# Patient Record
Sex: MALE | Race: WHITE | Employment: FULL TIME | URBAN - METROPOLITAN AREA
[De-identification: names, ages, dates, MRNs, and addresses within clinical notes are randomized per-mention and may not be internally consistent; named-entity substitution may affect disease eponyms.]

---

## 2018-01-03 ENCOUNTER — OFFICE VISIT (OUTPATIENT)
Dept: PULMONOLOGY | Age: 48
End: 2018-01-03

## 2018-01-03 ENCOUNTER — TELEPHONE (OUTPATIENT)
Dept: PULMONOLOGY | Age: 48
End: 2018-01-03

## 2018-01-03 VITALS
HEART RATE: 88 BPM | TEMPERATURE: 98.2 F | OXYGEN SATURATION: 95 % | SYSTOLIC BLOOD PRESSURE: 126 MMHG | WEIGHT: 305 LBS | DIASTOLIC BLOOD PRESSURE: 80 MMHG | HEIGHT: 68 IN | BODY MASS INDEX: 46.23 KG/M2

## 2018-01-03 DIAGNOSIS — G47.33 OSA TREATED WITH BIPAP: ICD-10-CM

## 2018-01-03 DIAGNOSIS — G47.33 OSA (OBSTRUCTIVE SLEEP APNEA): Primary | ICD-10-CM

## 2018-01-03 PROCEDURE — 99243 OFF/OP CNSLTJ NEW/EST LOW 30: CPT | Performed by: INTERNAL MEDICINE

## 2018-01-03 RX ORDER — FEBUXOSTAT 40 MG/1
40 TABLET, FILM COATED ORAL DAILY
COMMUNITY

## 2018-01-03 ASSESSMENT — SLEEP AND FATIGUE QUESTIONNAIRES
HOW LIKELY ARE YOU TO NOD OFF OR FALL ASLEEP WHEN YOU ARE A PASSENGER IN A CAR FOR AN HOUR WITHOUT A BREAK: 0
HOW LIKELY ARE YOU TO NOD OFF OR FALL ASLEEP WHILE LYING DOWN TO REST IN THE AFTERNOON WHEN CIRCUMSTANCES PERMIT: 3
NECK CIRCUMFERENCE (INCHES): 17.25
HOW LIKELY ARE YOU TO NOD OFF OR FALL ASLEEP WHILE SITTING AND TALKING TO SOMEONE: 0
HOW LIKELY ARE YOU TO NOD OFF OR FALL ASLEEP IN A CAR, WHILE STOPPED FOR A FEW MINUTES IN TRAFFIC: 0
HOW LIKELY ARE YOU TO NOD OFF OR FALL ASLEEP WHILE SITTING QUIETLY AFTER LUNCH WITHOUT ALCOHOL: 0
HOW LIKELY ARE YOU TO NOD OFF OR FALL ASLEEP WHILE SITTING INACTIVE IN A PUBLIC PLACE: 0
HOW LIKELY ARE YOU TO NOD OFF OR FALL ASLEEP WHILE WATCHING TV: 0
HOW LIKELY ARE YOU TO NOD OFF OR FALL ASLEEP WHILE SITTING AND READING: 1
ESS TOTAL SCORE: 4

## 2018-12-04 ENCOUNTER — OFFICE VISIT (OUTPATIENT)
Dept: PULMONOLOGY | Age: 48
End: 2018-12-04
Payer: COMMERCIAL

## 2018-12-04 VITALS
HEIGHT: 68 IN | RESPIRATION RATE: 18 BRPM | DIASTOLIC BLOOD PRESSURE: 88 MMHG | SYSTOLIC BLOOD PRESSURE: 132 MMHG | BODY MASS INDEX: 46.53 KG/M2 | OXYGEN SATURATION: 96 % | WEIGHT: 307 LBS | HEART RATE: 74 BPM

## 2018-12-04 DIAGNOSIS — G47.33 OSA TREATED WITH BIPAP: Primary | ICD-10-CM

## 2018-12-04 PROCEDURE — 99213 OFFICE O/P EST LOW 20 MIN: CPT | Performed by: INTERNAL MEDICINE

## 2018-12-04 ASSESSMENT — SLEEP AND FATIGUE QUESTIONNAIRES
ESS TOTAL SCORE: 3
HOW LIKELY ARE YOU TO NOD OFF OR FALL ASLEEP WHILE LYING DOWN TO REST IN THE AFTERNOON WHEN CIRCUMSTANCES PERMIT: 2
HOW LIKELY ARE YOU TO NOD OFF OR FALL ASLEEP WHILE SITTING INACTIVE IN A PUBLIC PLACE: 0
HOW LIKELY ARE YOU TO NOD OFF OR FALL ASLEEP WHEN YOU ARE A PASSENGER IN A CAR FOR AN HOUR WITHOUT A BREAK: 1
HOW LIKELY ARE YOU TO NOD OFF OR FALL ASLEEP WHILE SITTING AND READING: 0
HOW LIKELY ARE YOU TO NOD OFF OR FALL ASLEEP WHILE WATCHING TV: 0
HOW LIKELY ARE YOU TO NOD OFF OR FALL ASLEEP IN A CAR, WHILE STOPPED FOR A FEW MINUTES IN TRAFFIC: 0
HOW LIKELY ARE YOU TO NOD OFF OR FALL ASLEEP WHILE SITTING AND TALKING TO SOMEONE: 0
HOW LIKELY ARE YOU TO NOD OFF OR FALL ASLEEP WHILE SITTING QUIETLY AFTER LUNCH WITHOUT ALCOHOL: 0
NECK CIRCUMFERENCE (INCHES): 16.5

## 2018-12-04 NOTE — PROGRESS NOTES
Chief Complaint: Snoring, evaluate for Obstructive Sleep Apnea     Referring Provider: Maxine Capone    Interval History:     Uses BiPAP almost every night, average use is 4 hours, residual AHI is 1, 90% pressure is just above EPAP 13. Benefits from use. Pressure on nasal bridge from mask    Presenting HPI: This is a 49-year-old white male history of obstructive sleep apnea associated with daytime sleepiness, previously treated with BiPAP with improvement. He lost some weight and stopped BiPAP. However he started having significant nocturnal awakenings associated with nocturia approximately once each hour. He restarted his previous BiPAP and has had improvement in his sleep quality but needs new supplies. No exacerbating factors. reports that he has never smoked. His smokeless tobacco use includes Snuff. Rollins sleepiness score:  3    Physical Exam:  Blood pressure 132/88, pulse 74, resp. rate 18, height 5' 8\" (1.727 m), weight (!) 307 lb (139.3 kg), SpO2 96 %.'  Constitutional:  No acute distress. HENT:  Oropharynx is clear and moist. Mallampati Class 1 with UPP  Neck: . No tracheal deviation present. No obvious masses. Neck Circumference: 16.5  Pulmonary/Chest:   No accessory muscle usage or stridor. Musculoskeletal:  No clubbing. Psychiatric: Normal mood and affect. Jan 2014 Split with AHI of 47.5, down to 1.3 with BiPAP of 21/7    Assessment:      1. Obstructive Sleep Apnea - severe with AHI of 47     Not addressed today  2. Atrial fibrillation followed by Dr. Verona Babinski   3. Hypertension      Plan:      1. BiPAP Auto max 24, min 15 (increased from 13), PS 4-7 from 21/17    New mask fitting and supplies from [employer]  (would like to do here if possible and would like to try [employer])  2. Patient has been advised: no driving while sleepy; weight loss recommended; systemic benefits of CPAP therapy have been discussed.       Follow up: 12 months

## 2018-12-07 ENCOUNTER — TELEPHONE (OUTPATIENT)
Dept: PULMONOLOGY | Age: 48
End: 2018-12-07

## 2018-12-07 DIAGNOSIS — G47.33 OSA (OBSTRUCTIVE SLEEP APNEA): Primary | ICD-10-CM

## 2019-04-16 ENCOUNTER — TELEPHONE (OUTPATIENT)
Dept: BARIATRICS/WEIGHT MGMT | Age: 49
End: 2019-04-16

## 2019-12-02 ENCOUNTER — TELEPHONE (OUTPATIENT)
Dept: PULMONOLOGY | Age: 49
End: 2019-12-02

## 2019-12-31 ENCOUNTER — HOSPITAL ENCOUNTER (OUTPATIENT)
Dept: GENERAL RADIOLOGY | Age: 49
Discharge: HOME | End: 2019-12-31
Payer: COMMERCIAL

## 2019-12-31 ENCOUNTER — HOSPITAL ENCOUNTER (OUTPATIENT)
Age: 49
Discharge: HOME | End: 2019-12-31
Payer: COMMERCIAL

## 2019-12-31 PROCEDURE — 71046 X-RAY EXAM CHEST 2 VIEWS: CPT

## 2020-01-02 ENCOUNTER — TELEPHONE (OUTPATIENT)
Dept: BARIATRICS/WEIGHT MGMT | Age: 50
End: 2020-01-02

## 2020-01-02 NOTE — TELEPHONE ENCOUNTER
Called as a new pt courtesy call - spoke w patient. Did receive paperwork - told patient to have new pt paperwork completely filled out, insurance card, and id and to arrive at appt time. If they didn't have the paperwork filled out and arrive on time may be rescheduled. Told to bring possible [facility] and [facility] AT [place] location.

## 2020-01-09 ENCOUNTER — OFFICE VISIT (OUTPATIENT)
Dept: BARIATRICS/WEIGHT MGMT | Age: 50
End: 2020-01-09
Payer: COMMERCIAL

## 2020-01-09 VITALS
WEIGHT: 303 LBS | BODY MASS INDEX: 45.92 KG/M2 | DIASTOLIC BLOOD PRESSURE: 89 MMHG | OXYGEN SATURATION: 96 % | HEIGHT: 68 IN | HEART RATE: 75 BPM | RESPIRATION RATE: 18 BRPM | SYSTOLIC BLOOD PRESSURE: 138 MMHG

## 2020-01-09 PROBLEM — E78.2 MIXED HYPERLIPIDEMIA: Status: ACTIVE | Noted: 2020-01-09

## 2020-01-09 PROBLEM — E66.01 MORBID OBESITY WITH BMI OF 45.0-49.9, ADULT (HCC): Status: ACTIVE | Noted: 2020-01-09

## 2020-01-09 PROBLEM — G47.33 OBSTRUCTIVE SLEEP APNEA: Status: ACTIVE | Noted: 2020-01-09

## 2020-01-09 PROBLEM — I10 ESSENTIAL HYPERTENSION: Status: ACTIVE | Noted: 2020-01-09

## 2020-01-09 PROBLEM — Z01.818 PRE-OPERATIVE CLEARANCE: Status: ACTIVE | Noted: 2020-01-09

## 2020-01-09 PROCEDURE — 99205 OFFICE O/P NEW HI 60 MIN: CPT | Performed by: SURGERY

## 2020-01-09 NOTE — PROGRESS NOTES
Alcohol use: Yes     Comment: SUSANA;     I counseled the patient on the importance of not smoking and risks of ETOH. No Known Allergies  Vitals:    01/09/20 0827   BP: 138/89   Pulse: 75   Resp: 18   SpO2: 96%   Weight: (!) 303 lb (137.4 kg)   Height: 5' 8\" (1.727 m)       Body mass index is 46.07 kg/m². Current Outpatient Medications:     febuxostat (ULORIC) 40 MG TABS tablet, Take 40 mg by mouth daily, Disp: , Rfl:     doxazosin (CARDURA) 1 MG tablet, Take 1 mg by mouth nightly.  , Disp: , Rfl:     amLODIPine-benazepril (LOTREL) 10-40 MG per capsule, Take 1 capsule by mouth daily. , Disp: 30 capsule, Rfl: 3    carvedilol (COREG) 12.5 MG tablet, Take 1 tablet by mouth 2 times daily (with meals). , Disp: 60 tablet, Rfl: 3    indomethacin (INDOCIN) 50 MG capsule, Take 1 capsule by mouth 3 times daily (with meals) for 20 doses, Disp: 20 capsule, Rfl: 0    aspirin  MG EC tablet, Take 1 tablet by mouth daily. , Disp: 30 tablet, Rfl: 3      Review of Systems - History obtained from the patient  General ROS: overweight   Psychological ROS: negative  Ophthalmic ROS: negative  Neurological ROS: negative  ENT ROS: negative  Allergy and Immunology ROS: negative  Hematological and Lymphatic ROS: negative  Endocrine ROS: overweight  Breast ROS: negative  Respiratory ROS: negative  Cardiovascular ROS: negative  Gastrointestinal ROS:negative  Genito-Urinary ROS: negative  Musculoskeletal ROS: weight effects on joints  Skin ROS: negative    Physical Exam   Constitutional: Patient is oriented to person, place, and time. Vital signs are normal. Patient  appears well-developed and well-nourished. Patient  is active and cooperative. Non-toxic appearance. No distress. HENT:   Head: Normocephalic and atraumatic. Head is without laceration. Right Ear: External ear normal. No lacerations. No drainage, swelling or tenderness. Left Ear: External ear normal. No lacerations. No drainage, swelling or tenderness.    Nose: subscore is 4. GCS verbal subscore is 5. GCS motor subscore is 6. Skin: Skin is warm and dry. No abrasion and no rash noted. Patient  is not diaphoretic. No cyanosis or erythema. Psychiatric: Patient has a normal mood and affect. speech is normal and behavior is normal. Cognition and memory are normal.         Joelle Nina was seen today for bariatric, initial visit. Diagnoses and all orders for this visit:    Morbid obesity with BMI of 45.0-49.9, adult ([facility] [address]  -     US Gallbladder Ruq; Future  -     Ambulatory referral to Cardiology  -     CBC Auto Differential; Future  -     Comprehensive Metabolic Panel; Future  -     Hemoglobin A1C; Future  -     Iron and TIBC; Future  -     Lipid Panel; Future  -     TSH with Reflex; Future  -     Vitamin B12 & Folate; Future  -     Vitamin A; Future  -     Vitamin B1, Whole Blood; Future  -     Vitamin D 25 Hydroxy; Future  -     Vitamin E; Future  -     Vitamin K1; Future    Pre-operative clearance  -     US Gallbladder Ruq; Future  -     Ambulatory referral to Cardiology  -     CBC Auto Differential; Future  -     Comprehensive Metabolic Panel; Future  -     Hemoglobin A1C; Future  -     Iron and TIBC; Future  -     Lipid Panel; Future  -     TSH with Reflex; Future  -     Vitamin B12 & Folate; Future  -     Vitamin A; Future  -     Vitamin B1, Whole Blood; Future  -     Vitamin D 25 Hydroxy; Future  -     Vitamin E; Future  -     Vitamin K1; Future    Essential hypertension  -     US Gallbladder Ruq; Future  -     Ambulatory referral to Cardiology  -     CBC Auto Differential; Future  -     Comprehensive Metabolic Panel; Future  -     Hemoglobin A1C; Future  -     Iron and TIBC; Future  -     Lipid Panel; Future  -     TSH with Reflex; Future  -     Vitamin B12 & Folate; Future  -     Vitamin A; Future  -     Vitamin B1, Whole Blood; Future  -     Vitamin D 25 Hydroxy;  Future  -     Vitamin E; Future  -     Vitamin K1; Future    Mixed hyperlipidemia  -     US Gallbladder Ruq; Future  -     Ambulatory referral to Cardiology  -     CBC Auto Differential; Future  -     Comprehensive Metabolic Panel; Future  -     Hemoglobin A1C; Future  -     Iron and TIBC; Future  -     Lipid Panel; Future  -     TSH with Reflex; Future  -     Vitamin B12 & Folate; Future  -     Vitamin A; Future  -     Vitamin B1, Whole Blood; Future  -     Vitamin D 25 Hydroxy; Future  -     Vitamin E; Future  -     Vitamin K1; Future    Obstructive sleep apnea  -     US Gallbladder Ruq; Future  -     Ambulatory referral to Cardiology  -     CBC Auto Differential; Future  -     Comprehensive Metabolic Panel; Future  -     Hemoglobin A1C; Future  -     Iron and TIBC; Future  -     Lipid Panel; Future  -     TSH with Reflex; Future  -     Vitamin B12 & Folate; Future  -     Vitamin A; Future  -     Vitamin B1, Whole Blood; Future  -     Vitamin D 25 Hydroxy; Future  -     Vitamin E; Future  -     Vitamin K1; Future          A/P  Hever Hwang is a very pleasant 44 y.o. male with Obesity,  Body mass index is 46.07 kg/m². and multiple obesity related co-morbidities. Hever Hwang is very motivated to lose weight and being more healthy. We discussed how his weight affects his overall health including:  Patient Active Problem List   Diagnosis    Hypertension    A-fib ([facility] [address]    VICKIE (obstructive sleep apnea)    Pre-operative clearance    Essential hypertension    Mixed hyperlipidemia    Obstructive sleep apnea    Morbid obesity with BMI of 45.0-49.9, adult ([facility] [address]      The patient underwent 30 minutes of dietary counseling. I have reviewed, discussed and agree with the dietary plan. Medical weight loss and different surgical options were discussed in details with patient. Joelle Nina is interested in surgical weight loss for future weight loss. Patient is interested in Laparoscopic Sleeve Gastrectomy, which I believe is an excellent option. We will proceed with pre-operative work up labs and studies. Will also petition patient's  insurance for approval for this procedure. I advised the patient that we can't guarantee final insurance approval.    Patient received dietary handouts and education. Patient advised that its their responsibility to follow up for studies, referrals and/or labs ordered today. Also discussed in details the importance of follow up, as well following the recommendations and completing the whole program to improve outcomes when it comes to healthier lifestyle as well weight loss. Patient also advised about risks and benefits being on a strict dietary regimen as well using supplements. Patient agrees and wants to proceed with weight loss planning. Current weight is 303 pounds. Based on my medical opinion patient goal weight should be around 160 pounds that makes his BMI 24. Which should help improve his mobility, sleep apnea, high blood pressure, high cholesterol and back pain. Patient Instructions   Patient received dietary handouts and education. Labs ordered. Psych Evaluation. Cardiac Clearance. BiPAP Compliance. UltraSound Gallbladder. EGD (Upper Endoscopy). Support Group. PCP Letter. Quit Smoking,  Alcohol, Caffeine and Carbonated Drinks  Weight History 2 yrs. F/U in 4 weeks. Patient advised that its their responsibility to follow up for studies, referrals and/or labs ordered today.

## 2020-01-28 ENCOUNTER — TELEPHONE (OUTPATIENT)
Dept: CARDIOLOGY CLINIC | Age: 50
End: 2020-01-28

## 2020-01-28 ENCOUNTER — OFFICE VISIT (OUTPATIENT)
Dept: CARDIOLOGY CLINIC | Age: 50
End: 2020-01-28
Payer: COMMERCIAL

## 2020-01-28 VITALS
HEIGHT: 68 IN | OXYGEN SATURATION: 98 % | SYSTOLIC BLOOD PRESSURE: 124 MMHG | HEART RATE: 78 BPM | DIASTOLIC BLOOD PRESSURE: 78 MMHG | BODY MASS INDEX: 46.07 KG/M2

## 2020-01-28 PROCEDURE — 99204 OFFICE O/P NEW MOD 45 MIN: CPT | Performed by: INTERNAL MEDICINE

## 2020-01-28 PROCEDURE — 93000 ELECTROCARDIOGRAM COMPLETE: CPT | Performed by: INTERNAL MEDICINE

## 2020-01-28 NOTE — LETTER
[facility]   CARDIAC EVALUATION NOTE  [phone]      PCP:  Cleola Leyden, APRN - CNP    Reason for Consultation/Chief Complaint: cardiac clearance    Subjective   History of Present Illness:  Ethan Champagne is a 44 y.o. patient with a history of PAF, HTN and HLD who presents with pre-operative cardiac risk assessment. He was initially diagnosed with AFIB in 2005 in which he converted to NSR after few days. He was not placed on coumadin. He was  seen by PCP with symptoms of palpitations and EKG on 11/6/12 in office showed AFIB  BPM. He was started on Xarelto. At his OV in 2012, he had complaints of having palpitations on and off for several years. He didn't realize that he was in AFIB again until PCP took EKG. He had felt the palpitations, and reported each episode would last for about 2 hours. He had no associated symptoms of dizziness, SOB, or neurologic symptoms. His ekg on 11/8/12 revealed NSR HR BPM 68. His CHADS score=1. He underwent successful Cardioversion in 12/2012. He has remained in Normal Sinus Rhythm. Today his EKG shows AFib. He reports he feels well overall. He denies CP, SOB, dizziness, palpitations or syncope. He is not on aspirin or Xarelto. He states his medications have not changed. He denies DM, MI, or strokes. He denies smoking or alcohol. He is active with work and caring for two toddlers at home. He has VICKIE and uses a CPAP. Past Medical History:   has a past medical history of Atrial fibrillation ([address] Hyperlipidemia, Hypertension, and Unspecified sleep apnea. Surgical History:   has a past surgical history that includes Vasectomy and UPPP. Social History:   reports that he has never smoked. His smokeless tobacco use includes snuff. He reports current alcohol use. Family History:  family history includes Alcohol Abuse in his father and mother; Elevated Lipids in his father; Heart Disease in his sister;  Hypertension in his father; Kidney Disease in his maternal grandmother; Stroke in his paternal grandfather. Home Medications:  Were reviewed and are listed in nursing record and/or below  Prior to Admission medications    Medication Sig Start Date End Date Taking? Authorizing Provider   febuxostat (ULORIC) 40 MG TABS tablet Take 40 mg by mouth daily   Yes Historical Provider, MD   doxazosin (CARDURA) 1 MG tablet Take 1 mg by mouth nightly. Yes Historical Provider, MD   amLODIPine-benazepril (LOTREL) 10-40 MG per capsule Take 1 capsule by mouth daily. 11/16/12  Yes Ethel Valerio MD   carvedilol (COREG) 12.5 MG tablet Take 1 tablet by mouth 2 times daily (with meals). 11/16/12  Yes Ethel Valerio MD   indomethacin (INDOCIN) 50 MG capsule Take 1 capsule by mouth 3 times daily (with meals) for 20 doses 7/18/16 7/25/16  Jenniffer Ramirez PA-C          Allergies:  Patient has no known allergies. Review of Systems:   A 14 point review of symptoms completed. Pertinent positives identified in the HPI, all other review of symptoms negative as below.       Objective   PHYSICAL EXAM:    Vitals:    01/28/20 0811   BP: 124/78   Pulse: 78   SpO2: 98%              General Appearance:  Alert, cooperative, no distress, appears stated age   Head:  Normocephalic, without obvious abnormality, atraumatic   Eyes:  PERRL, conjunctiva/corneas clear   Nose: Nares normal, no drainage or sinus tenderness   Throat: Lips, mucosa, and tongue normal   Neck: Supple, symmetrical, trachea midline, no adenopathy, thyroid: not enlarged, symmetric, no tenderness/mass/nodules, no carotid bruit or JVD   Lungs:   Clear to auscultation bilaterally, respirations unlabored   Chest Wall:  No deformity or tenderness   Heart:  irregular rate and rhythm, S1, S2 normal, no murmur, rub or gallop   Abdomen:   Soft, non-tender, bowel sounds active all four quadrants,  no masses, no organomegaly   Extremities: Extremities normal, atraumatic, no cyanosis or edema 4. Discussed ablation therapy for afib  5. EP referral to discuss ablation therapy  6. Ok to proceed with surgery at low cardiac risk  7. Follow up as needed      Thank you for allowing us to participate in the care of [employer]. Please call me with any questions [phone]. Estevan Davis MD, [facility]   Interventional Cardiologist  [place]   [phone] [facility]  [phone] [address]  1/28/2020 8:38 AM    I will address the patient's cardiac risk factors and adjusted pharmacologic treatment as needed. In addition, I have reinforced the need for patient directed risk factor modification. Tobacco use was discussed with the patient and educated on the negative effects and was asked not to use. All questions and concerns were addressed to the patient/family. Alternatives to my treatment were discussed. I, Dr Estevan Davis, personally performed the services described in this documentation, as scribed by the above signed scribe in my presence. It is both accurate and complete to my knowledge. I agree with the details independently gathered by the clinical support staff and the scribed note accurately describes my personal service to the patient.

## 2020-01-28 NOTE — PATIENT INSTRUCTIONS
1. Will obtain labs from [facility]   2. Echo to evaluate for pre-operative clearance and AFib  3. Recommend starting Eliquis 5 mg twice daily  4. Discussed ablation therapy for afib  5. EP referral to discuss ablation therapy  6. Ok to proceed with surgery at low cardiac risk  7. Follow up as needed    Your provider has ordered testing for further evaluation. An order/prescription has been included in your paper work.  Central Schedule should contact you within three business days to schedule the test or you can contact Central Scheduling sooner by calling [employer].  If Central scheduling does not contact within three business days, please call to schedule the test by calling [facility]. ([phone])

## 2020-01-29 ENCOUNTER — TELEPHONE (OUTPATIENT)
Dept: CARDIOLOGY CLINIC | Age: 50
End: 2020-01-29

## 2020-01-29 NOTE — TELEPHONE ENCOUNTER
EXKSMQ:[id number];KLARISSA:[id number]; Review Type:Prior Auth; Coverage Start Date:12/30/2019; Coverage End Date:01/28/2021;  Pa approved and ran through by pharmacy. Co-pay is still $193. Pt informed and voiced understanding      Pt asking if there is a cheaper option.  Please advise

## 2020-01-30 ENCOUNTER — TELEPHONE (OUTPATIENT)
Dept: CARDIOLOGY CLINIC | Age: 50
End: 2020-01-30

## 2020-01-30 ENCOUNTER — TELEPHONE (OUTPATIENT)
Dept: CARDIOLOGY | Age: 50
End: 2020-01-30

## 2020-01-30 NOTE — TELEPHONE ENCOUNTER
Lets call patient to see what blood thinner he has decided on and is taking, we have sent scripts for a few of them and lets clean up/reconcile his med list to make sure it is accurate. Thank you.

## 2020-01-30 NOTE — TELEPHONE ENCOUNTER
Spoke with pt, let him know samples are ready for  in [place]. Pt v/u and states he can not afford Eliquis and wants to know if there is something cheaper? He is not interested in Pt assistance. He states he will not qualify. Please advise.

## 2020-01-31 ENCOUNTER — HOSPITAL ENCOUNTER (OUTPATIENT)
Dept: ULTRASOUND IMAGING | Age: 50
Discharge: HOME | End: 2020-01-31
Payer: COMMERCIAL

## 2020-01-31 PROCEDURE — 76705 ECHO EXAM OF ABDOMEN: CPT

## 2020-02-04 ENCOUNTER — TELEPHONE (OUTPATIENT)
Dept: CARDIOLOGY CLINIC | Age: 50
End: 2020-02-04

## 2020-02-04 NOTE — TELEPHONE ENCOUNTER
Lets assess if he has insurance that will cover Pradaxa, if so would recommend 150 mg p.o. twice daily and would discontinue the previous orders for Eliquis and Xarelto. We will check with him and his pharmacy if a preapproval is needed for insurance to cover these types of medications, would also consider sayasa if that is on a preferred formulary per his pharmacy and insurance. Thank you.

## 2020-02-05 RX ORDER — DABIGATRAN ETEXILATE 150 MG/1
150 CAPSULE, COATED PELLETS ORAL 2 TIMES DAILY
Qty: 60 CAPSULE | Refills: 5 | Status: SHIPPED | OUTPATIENT
Start: 2020-02-05 | End: 2020-02-12 | Stop reason: SDUPTHER

## 2020-02-07 ENCOUNTER — HOSPITAL ENCOUNTER (OUTPATIENT)
Dept: NON INVASIVE DIAGNOSTICS | Age: 50
Discharge: HOME | End: 2020-02-07
Payer: COMMERCIAL

## 2020-02-07 LAB
LV EF: 58 %
LVEF MODALITY: NORMAL

## 2020-02-07 PROCEDURE — 93306 TTE W/DOPPLER COMPLETE: CPT

## 2020-02-08 PROBLEM — Z01.818 PRE-OPERATIVE CLEARANCE: Status: RESOLVED | Noted: 2020-01-09 | Resolved: 2020-02-08

## 2020-02-10 ENCOUNTER — TELEPHONE (OUTPATIENT)
Dept: CARDIOLOGY CLINIC | Age: 50
End: 2020-02-10

## 2020-02-11 NOTE — PROGRESS NOTES
[facility]   Electrophysiology Consult Note              Date:  February 12, 2020  Patient name: Tyron Botello  YOB: 1970    Reason for Consult:  New Patient and Atrial Fibrillation    Requesting Physician:BAKARI Bueno CNP    HISTORY OF PRESENT ILLNESS: Tyron Botello is a 44 y.o. male who presents for evaluation for atrial fibrillation. He has a PMH of paroxysmal atrial fibrillation (2005), obstructive sleep apnea, hypertension, and hyperlipidemia. He underwent cardioversion in 12/2012. His EKG on 1/28/20 showed he was in AFIB. He has obstructive sleep apnea and wears a CPAP. He is not on aspirin or Xarelto. He was placed on Eliquis on 1/28/2020. Her echo on 2/7/2020 showed an EF of 55-60%. Today he states that he has no symptoms with afib. He denies chest pain or shortness of breath. He has felt good since we last saw him in 2012. He stated he just got Pradaxa today and has started it. He finished his samples of eliquis that he had first. His EKG from today shows AFIB 84 bpm.  He states that he wears his CPAP. Past Medical History:   has a past medical history of Atrial fibrillation ([address] Hyperlipidemia, Hypertension, and Unspecified sleep apnea. Past Surgical History:   has a past surgical history that includes Vasectomy and UPPP. Allergies:  Patient has no known allergies. Social History:   reports that he has never smoked. His smokeless tobacco use includes snuff. He reports current alcohol use. Family History: family history includes Alcohol Abuse in his father and mother; Elevated Lipids in his father; Heart Disease in his sister; Hypertension in his father; Kidney Disease in his maternal grandmother; Stroke in his paternal grandfather. Home Medications:    Prior to Admission medications    Medication Sig Start Date End Date Taking?  Authorizing Provider   apixaban (ELIQUIS) 5 MG TABS tablet Take by mouth daily   Yes related activities with a goal of 150 min/week of moderate level activity or 10,000 steps per day. The patient was seen for >25 minutes. >50% of the time was devoted to giving the patient detailed instructions instructions on addressing diet, regular exercise, weight control, smoking abstention, medication compliance, and stress minimization. The patient was provided written and verbal instructions regarding risk factor modification. 5.  Follow up in 1 month with June Ashley CNP      QUALITY MEASURES  1. Tobacco Cessation Counseling: NA  2. Retake of BP if >140/90:   Yes  3. Documentation to PCP/referring for new patient:  Sent to PCP at close of office visit  4. CAD patient on anti-platelet: NA  5. CAD patient on STATIN therapy:  NA  6. Patient with CHF and aFib on anticoagulation:  Yes       This note was scribed in the presence of SAM Lowe MD by Teddi Hodgkin, RN.     I, Todd Santamaria, personally performed the services described in this documentation as scribed by the above signed scribe in my presence, and it is both accurate and complete to the best of our ability and knowledge. I agree with the details independently gathered by my clinical support staff, while the remaining scribed note accurately describes my personal service to the patient. The above RN is working as a scribe for and in the presence of myself . Working as a scribe, the RN may have prepopulated components of this note with my historical intellectual property under my direct supervision. Any additions to this intellectual property were performed at my direction. Furthermore, the content and accuracy of this note have been reviewed by me to the best of my ability. All questions and concerns were addressed to the patient/family. Alternatives to my treatment were discussed. SAM Gallardo F.A.C.C. Electrophysiology  [facility]. [facility] [facility]. [address].   [place], [address]  Phone: [phone]  Fax:

## 2020-02-12 ENCOUNTER — OFFICE VISIT (OUTPATIENT)
Dept: CARDIOLOGY CLINIC | Age: 50
End: 2020-02-12
Payer: COMMERCIAL

## 2020-02-12 ENCOUNTER — OFFICE VISIT (OUTPATIENT)
Dept: PULMONOLOGY | Age: 50
End: 2020-02-12
Payer: COMMERCIAL

## 2020-02-12 ENCOUNTER — TELEPHONE (OUTPATIENT)
Dept: PULMONOLOGY | Age: 50
End: 2020-02-12

## 2020-02-12 VITALS
HEIGHT: 68 IN | DIASTOLIC BLOOD PRESSURE: 78 MMHG | HEART RATE: 9 BPM | BODY MASS INDEX: 45.77 KG/M2 | OXYGEN SATURATION: 96 % | RESPIRATION RATE: 18 BRPM | SYSTOLIC BLOOD PRESSURE: 124 MMHG | WEIGHT: 302 LBS

## 2020-02-12 VITALS
BODY MASS INDEX: 45.92 KG/M2 | DIASTOLIC BLOOD PRESSURE: 98 MMHG | WEIGHT: 303 LBS | HEART RATE: 78 BPM | HEIGHT: 68 IN | OXYGEN SATURATION: 95 % | SYSTOLIC BLOOD PRESSURE: 132 MMHG

## 2020-02-12 PROCEDURE — 99213 OFFICE O/P EST LOW 20 MIN: CPT | Performed by: INTERNAL MEDICINE

## 2020-02-12 PROCEDURE — 99204 OFFICE O/P NEW MOD 45 MIN: CPT | Performed by: INTERNAL MEDICINE

## 2020-02-12 PROCEDURE — 93000 ELECTROCARDIOGRAM COMPLETE: CPT | Performed by: INTERNAL MEDICINE

## 2020-02-12 RX ORDER — DABIGATRAN ETEXILATE 150 MG/1
150 CAPSULE, COATED PELLETS ORAL 2 TIMES DAILY
Qty: 180 CAPSULE | Refills: 3 | Status: SHIPPED | OUTPATIENT
Start: 2020-02-12

## 2020-02-12 RX ORDER — FLECAINIDE ACETATE 100 MG/1
100 TABLET ORAL 2 TIMES DAILY
Qty: 180 TABLET | Refills: 3 | Status: SHIPPED | OUTPATIENT
Start: 2020-02-12

## 2020-02-12 ASSESSMENT — SLEEP AND FATIGUE QUESTIONNAIRES
HOW LIKELY ARE YOU TO NOD OFF OR FALL ASLEEP IN A CAR, WHILE STOPPED FOR A FEW MINUTES IN TRAFFIC: 0
NECK CIRCUMFERENCE (INCHES): 17.5
HOW LIKELY ARE YOU TO NOD OFF OR FALL ASLEEP WHILE LYING DOWN TO REST IN THE AFTERNOON WHEN CIRCUMSTANCES PERMIT: 0
ESS TOTAL SCORE: 0
HOW LIKELY ARE YOU TO NOD OFF OR FALL ASLEEP WHEN YOU ARE A PASSENGER IN A CAR FOR AN HOUR WITHOUT A BREAK: 0
HOW LIKELY ARE YOU TO NOD OFF OR FALL ASLEEP WHILE SITTING INACTIVE IN A PUBLIC PLACE: 0
HOW LIKELY ARE YOU TO NOD OFF OR FALL ASLEEP WHILE SITTING QUIETLY AFTER LUNCH WITHOUT ALCOHOL: 0
HOW LIKELY ARE YOU TO NOD OFF OR FALL ASLEEP WHILE SITTING AND TALKING TO SOMEONE: 0
HOW LIKELY ARE YOU TO NOD OFF OR FALL ASLEEP WHILE SITTING AND READING: 0
HOW LIKELY ARE YOU TO NOD OFF OR FALL ASLEEP WHILE WATCHING TV: 0

## 2020-02-12 NOTE — LETTER
Teja Chu   Electrophysiology Consult Note              Date:  February 12, 2020  Patient name: Jj Bates  YOB: 1970    Reason for Consult:  New Patient and Atrial Fibrillation    Requesting Physician:BAKARI Calderon CNP    HISTORY OF PRESENT ILLNESS: Jj Bates is a 44 y.o. male who presents for evaluation for atrial fibrillation. He has a PMH of paroxysmal atrial fibrillation (2005), obstructive sleep apnea, hypertension, and hyperlipidemia. He underwent cardioversion in 12/2012. His EKG on 1/28/20 showed he was in AFIB. He has obstructive sleep apnea and wears a CPAP. He is not on aspirin or Xarelto. He was placed on Eliquis on 1/28/2020. Her echo on 2/7/2020 showed an EF of 55-60%. Today he states that he has no symptoms with afib. He denies chest pain or shortness of breath. He has felt good since we last saw him in 2012. He stated he just got Pradaxa today and has started it. He finished his samples of eliquis that he had first. His EKG from today shows AFIB 84 bpm.  He states that he wears his CPAP. Past Medical History:   has a past medical history of Atrial fibrillation ([address] Hyperlipidemia, Hypertension, and Unspecified sleep apnea. Past Surgical History:   has a past surgical history that includes Vasectomy and UPPP. Allergies:  Patient has no known allergies. Social History:   reports that he has never smoked. His smokeless tobacco use includes snuff. He reports current alcohol use. Family History: family history includes Alcohol Abuse in his father and mother; Elevated Lipids in his father; Heart Disease in his sister; Hypertension in his father; Kidney Disease in his maternal grandmother; Stroke in his paternal grandfather. Home Medications:    Prior to Admission medications    Medication Sig Start Date End Date Taking?  Authorizing Provider apixaban (ELIQUIS) 5 MG TABS tablet Take by mouth daily   Yes Historical Provider, MD   febuxostat (ULORIC) 40 MG TABS tablet Take 40 mg by mouth daily   Yes Historical Provider, MD   doxazosin (CARDURA) 1 MG tablet Take 1 mg by mouth nightly. Yes Historical Provider, MD   amLODIPine-benazepril (LOTREL) 10-40 MG per capsule Take 1 capsule by mouth daily. 11/16/12  Yes Jasen Florence MD   carvedilol (COREG) 12.5 MG tablet Take 1 tablet by mouth 2 times daily (with meals). 11/16/12  Yes Jasen Florence MD   dabigatran (PRADAXA) 150 MG capsule Take 1 capsule by mouth 2 times daily  Patient not taking: Reported on 2/12/2020 2/5/20   Sandro Sanches MD          REVIEW OF SYSTEMS:      All 14-point review of systems are completed and  pertinent positives are mentioned in the history of present illness. Other  systems are reviewed and are negative. Physical Examination:    BP (!) 138/99   Pulse 78   Ht 5' 8\" (1.727 m)   Wt (!) 303 lb (137.4 kg)   SpO2 95%   BMI 46.07 kg/m²       Constitutional and General Appearance:    alert, cooperative, no distress and appears stated age  [de-identified]:    PERRLA, no cervical lymphadenopathy. No masses palpable. Normal oral  mucosa  Respiratory:  · Normal excursion and expansion without use of accessory muscles  · Resp Auscultation: Normal breath sounds without dullness or wheezing  Cardiovascular:  · The apical impulse is not displaced  · Heart tones are crisp and normal. irregular S1 and S2.  · Jugular venous pulsation Normal  · The carotid upstroke is normal in amplitude and contour without delay or bruit  · Peripheral pulses are symmetrical and full  Abdomen:  · No masses or tenderness  · Bowel sounds present  Extremities:  ·  No Cyanosis or Clubbing  ·  Lower extremity edema: No  · Skin: Warm and dry  Neurological:  · Alert and oriented.   · Moves all extremities well  · No abnormalities of mood, affect, memory, mentation, or behavior are noted      DATA: EC2020  Afib 98 bpm  EC20  AFIB 84 bpm  ECHO: 2020  Summary   Normal left ventricular systolic function with an estimated ejection   fraction of 55-60%. No regional wall motion abnormalities are seen. There is mild concentric left ventricular hypertrophy. Normal left ventricular diastolic filling pressure. Normal systolic pulmonary artery pressure (SPAP) estimated at mmHg (RA   pressure 3 mmHg). No significant valvular heart disease. Echo 2012 EF 55%, lvh, trace MR and TR  LA volume/Index: 48 ml /20 ml/m2    [id number] Score for Atrial Fibrillation Stroke Risk   Risk   Factors  Component Value   C CHF No 0   H HTN Yes 1   A2 Age >= 76 No,  (52 y.o.) 0   D DM No 0   S2 Prior Stroke/TIA No 0   V Vascular Disease No 0   A Age 74-69 No,  (52 y.o.) 0   Sc Sex male 0    [id number]  Score  1   Score last updated 11 9:51 PM    Click here for a link to the UpToDate guideline \"Atrial Fibrillation: Anticoagulation therapy to prevent embolization    Disclaimer: Risk Score calculation is dependent on accuracy of patient problem list and past encounter diagnosis. IMPRESSION:    1. Persistent atrial fibrillation   ~s/p DCCV    ~[id number] Score 1 (HTN)   ~previously on Flecainide  2. Obstructive sleep apnea   ~wears a CPAP    3. Obesity   -Morbid Obesity (Body mass index is 46 kg/m². ) - Complicating assessment and treatment.  Placing patient at risk for multiple comorbidities as well as early death and contributing to the patients presentation.  Counseled on weight loss.        RECOMMENDATIONS:  1.  Start Flecainide on Monday 100mg BID. 2.  Continue coreg  3. Encourage weight loss, watch diet, increase exercise  4. I recommend that the patient continue their currently prescribed medications. Their drug modifiable risk factors appear to be well controlled. I will continue to address the need/dosing of medications in future visits.

## 2020-02-12 NOTE — PATIENT INSTRUCTIONS
1.  Start Flecainide on Monday 100mg BID. 2.  Continue coreg  3. Encourage weight loss, watch diet, increase exercise  4.   Follow up in 1 month with Anna Ackerman CNP

## 2020-02-12 NOTE — PROGRESS NOTES
Chief Complaint: Snoring, evaluate for Obstructive Sleep Apnea     Referring Provider: Laura Harman History:     Uses BiPAP almost every night, average use is 5 hours 30 minutes, residual AHI is 10, 90% pressure is 22/17. AHI is up compared to prior when EPAP max was 13. Benefits from use. Presenting HPI: This is a 40-year-old white male history of obstructive sleep apnea associated with daytime sleepiness, previously treated with BiPAP with improvement. He lost some weight and stopped BiPAP. However he started having significant nocturnal awakenings associated with nocturia approximately once each hour. He restarted his previous BiPAP and has had improvement in his sleep quality but needs new supplies. No exacerbating factors. reports that he has never smoked. His smokeless tobacco use includes snuff. [place] sleepiness score: 0    Physical Exam:  Blood pressure 124/78, pulse (!) 9, resp. rate 18, height 5' 8\" (1.727 m), weight (!) 302 lb (137 kg), SpO2 96 %.'  Constitutional:  No acute distress. HENT:  Oropharynx is clear and moist. Mallampati Class 1 with UPP  Neck: . No tracheal deviation present. No obvious masses. Neck Circumference: 17.5  Pulmonary/Chest:   No accessory muscle usage or stridor. Musculoskeletal:  No clubbing. Psychiatric: Normal mood and affect. Jan 2014 Split with AHI of 47.5, down to 1.3 with BiPAP of 21/7    Assessment:      1. Obstructive Sleep Apnea - severe with AHI of 47     Not addressed today  2. Atrial fibrillation followed by Dr. Jorge Porter   3. Hypertension      Plan:      1. BiPAP changed from auto settings to 19/14 and repeat download in 30 days  2. Patient has been advised: no driving while sleepy; weight loss recommended; systemic benefits of CPAP therapy have been discussed.       Follow up: 12 months

## 2020-02-13 ENCOUNTER — TELEPHONE (OUTPATIENT)
Dept: CARDIOLOGY CLINIC | Age: 50
End: 2020-02-13

## 2020-02-13 NOTE — TELEPHONE ENCOUNTER
Pt was told to call office and let us know if his scripts from yesterday went to the correct [facility] and he sts that it did go to the right one that is on his chart.  Thank you

## 2020-02-25 NOTE — TELEPHONE ENCOUNTER
I had requested a pressure change to 19/14. No auto settings, just BiPAP. It looks like this was not done.

## 2020-02-25 NOTE — TELEPHONE ENCOUNTER
Actually it was done, but this f/u is only 14 days and I requested 30 days. Current report is better, but half the data is old settings. Download after full 30 days.

## 2020-03-30 NOTE — TELEPHONE ENCOUNTER
Due to covid 19 pandemic will have to get with pt in a few weeks to see if office is open to bring in machine

## 2020-05-29 NOTE — TELEPHONE ENCOUNTER
Spoke with pt whom states that he has not been able to take card to [facility] due to being changed to 3rd shift at work. Pt states that he will put SD card in an envelope and slide under office door and our office can mail it back to him. Pt plans to do this Monday.

## 2020-11-03 PROBLEM — I10 HYPERTENSION: Status: RESOLVED | Noted: 2020-11-03 | Resolved: 2020-11-03

## 2021-02-12 ENCOUNTER — TELEPHONE (OUTPATIENT)
Dept: PULMONOLOGY | Age: 51
End: 2021-02-12

## 2021-02-12 ENCOUNTER — VIRTUAL VISIT (OUTPATIENT)
Dept: PULMONOLOGY | Age: 51
End: 2021-02-12
Payer: COMMERCIAL

## 2021-02-12 DIAGNOSIS — G47.33 OSA (OBSTRUCTIVE SLEEP APNEA): Primary | ICD-10-CM

## 2021-02-12 DIAGNOSIS — G47.33 OBSTRUCTIVE SLEEP APNEA: Primary | ICD-10-CM

## 2021-02-12 PROCEDURE — 99442 PR PHYS/QHP TELEPHONE EVALUATION 11-20 MIN: CPT | Performed by: INTERNAL MEDICINE

## 2021-02-12 RX ORDER — BENAZEPRIL HYDROCHLORIDE 40 MG/1
40 TABLET, FILM COATED ORAL DAILY
COMMUNITY

## 2021-02-12 ASSESSMENT — SLEEP AND FATIGUE QUESTIONNAIRES
HOW LIKELY ARE YOU TO NOD OFF OR FALL ASLEEP WHILE SITTING AND READING: 0
HOW LIKELY ARE YOU TO NOD OFF OR FALL ASLEEP WHEN YOU ARE A PASSENGER IN A CAR FOR AN HOUR WITHOUT A BREAK: 0
HOW LIKELY ARE YOU TO NOD OFF OR FALL ASLEEP IN A CAR, WHILE STOPPED FOR A FEW MINUTES IN TRAFFIC: 0
HOW LIKELY ARE YOU TO NOD OFF OR FALL ASLEEP WHILE SITTING INACTIVE IN A PUBLIC PLACE: 0
HOW LIKELY ARE YOU TO NOD OFF OR FALL ASLEEP WHILE SITTING QUIETLY AFTER LUNCH WITHOUT ALCOHOL: 0
ESS TOTAL SCORE: 2
HOW LIKELY ARE YOU TO NOD OFF OR FALL ASLEEP WHILE LYING DOWN TO REST IN THE AFTERNOON WHEN CIRCUMSTANCES PERMIT: 1

## 2021-02-12 NOTE — TELEPHONE ENCOUNTER
Spoke with [facility] patient's machine does not have modem. Patient will take machine to [facility] 2/15/21 for download. Plan:2/12/21      1. BiPAP 19/14 -- get dowload 1st through [place], and then change pressure to 17/12 and repeat download in 30 days. In addition, send in an order for a new machine, current machine is waking him up with pressure changes and is very loud. He wants to know cost prior to purchasing though. 2.  Patient has been advised: no driving while sleepy; weight loss recommended; systemic benefits of CPAP therapy have been discussed.     3.  31-91 day f/u if new machine, otherwise one year.

## 2021-02-12 NOTE — PROGRESS NOTES
Chief Complaint: Snoring, evaluate for Obstructive Sleep Apnea     Referring Provider: Elba Downs    Interval History:     Uses BiPAP regularly with benefit. Every so often wakes him up with a strong burst of air. No current download, but last download looked okay. He has lost 40 pounds. Presenting HPI: This is a 61-year-old white male history of obstructive sleep apnea associated with daytime sleepiness, previously treated with BiPAP with improvement. He lost some weight and stopped BiPAP. However he started having significant nocturnal awakenings associated with nocturia approximately once each hour. He restarted his previous BiPAP and has had improvement in his sleep quality but needs new supplies. No exacerbating factors. reports that he has never smoked. His smokeless tobacco use includes snuff. [place] sleepiness score: 2    Physical Exam:  There were no vitals taken for this visit.'  Constitutional:  No acute distress. HENT:  Oropharynx is clear and moist. Mallampati Class 1 with UPP  Neck: . No tracheal deviation present. No obvious masses. Neck Circumference: 17.5  Pulmonary/Chest:   No accessory muscle usage or stridor. Musculoskeletal:  No clubbing. Psychiatric: Normal mood and affect. 2014 Split with AHI of 47.5, down to 1.3 with BiPAP of 21/7    Assessment:      1. Obstructive Sleep Apnea - severe with AHI of 47     Not addressed today  2. Atrial fibrillation followed by Dr. Beny Walls   3. Hypertension      Plan:      1. BiPAP  -- get dowload 1st through [place], and then change pressure to 17/12 and repeat download in 30 days. In addition, send in an order for a new machine, current machine is waking him up with pressure changes and is very loud. He wants to know cost prior to purchasing though. 2.  Patient has been advised: no driving while sleepy; weight loss recommended; systemic benefits of CPAP therapy have been discussed.     3.  31- day f/u if new machine, otherwise one year. Narinder Watts is a 44 y.o. male evaluated via telephone on 2/12/2021. Consent: He and/or health care decision maker is aware that that he may receive a bill for this telephone service, depending on his insurance coverage, and has provided verbal consent to proceed: YES. I communicated with the patient and/or health care decision maker about: see interval history above. Details of this discussion including any medical advice provided: see plan above. Total Time: minutes: 11-20 minutes. I affirm this is a Patient Initiated Episode with a Patient who has not had a related appointment within my department in the past 7 days or scheduled within the next 24 hours. Patient identification was verified at the start of the visit: YES  Pursuant to the emergency declaration under the [facility], [address] [place] [address] authority and the [employer] and [employer] Act, this Telephone Visit was conducted with patient's (and/or legal guardian's) consent, to reduce the patient's risk of exposure to COVID-19 and provide necessary medical care.   The patient (and/or legal guardian) his advised to contact this office for worsening conditions or problems, and seek emergency medical treatment and/or call 911 if urgent care needed,

## 2021-02-12 NOTE — TELEPHONE ENCOUNTER
Within this Telehealth Consent, the terms you and yours refer to the person using the Telehealth Service (Service), or in the case of a use of the Service by or on behalf of a minor, you and yours refer to and include (i) the parent or legal guardian who provides consent to the use of the Service by such minor or uses the Service on behalf of such minor, and (ii) the minor for whom consent is being provided or on whose behalf the Service is being utilized. When using Service, you will be consulting with your health care providers via the use of Telehealth.   Telehealth involves the delivery of healthcare services using electronic communications, information technology or other means between a healthcare provider and a patient who are not in the same physical location. Telehealth may be used for diagnosis, treatment, follow-up and/or patient education, and may include, but is not limited to, one or more of the following:    Electronic transmission of medical records, photo images, personal health information or other data between a patient and a healthcare provider    Interactions between a patient and healthcare provider via audio, video and/or data communications    Use of output data from medical devices, sound and video files    Anticipated Benefits   The use of Telehealth by your Provider(s) through the Service may have the following possible benefits:    Making it easier and more efficient for you to access medical care and treatment for the conditions treated by such Provider(s) utilizing the Service    Allowing you to obtain medical care and treatment by Provider(s) at times that are convenient for you    Enabling you to interact with Provider(s) without the necessity of an in-office appointment     Possible Risks   While the use of Telehealth can provide potential benefits for you, there are also potential risks associated with the use of Telehealth.  These risks include, but may not be limited to the following:    Your Provider(s) may not able to provide medical treatment for your particular condition and you may be required to seek alternative healthcare or emergency care services.  The electronic systems or other security protocols or safeguards used in the Service could fail, causing a breach of privacy of your medical or other information.  Given regulatory requirements in certain jurisdictions, your Provider(s) diagnosis and/or treatment options, especially pertaining to certain prescriptions, may be limited. Acceptance   1. You understand that Services will be provided via Telehealth. This process involves the use of HIPAA compliant and secure, real-time audio-visual interfacing with a qualified and appropriately trained provider located at [facility]. 2. You understand that, under no circumstances, will this session be recorded. 3. You understand that the Provider(s) at [facility] and other clinical participants will be party to the information obtained during the Telehealth session in accordance with best medical practices. 4. You understand that the information obtained during the Telehealth session will be used to help determine the most appropriate treatment options. 5. You understand that You have the right to revoke this consent at any point in time. 6. You understand that Telehealth is voluntary, and that continued treatment is not dependent upon consent. 7. You understand that, in the event of non-consent to Telehealth services and/or technical difficulties, you will obtain services as typically provided in the absence of Telehealth technology. 8. You understand that this consent will be kept in Your medical record. 9. No potential benefits from the use of Telehealth or specific results can be guaranteed. Your condition may not be cured or improved and, in some cases, may get worse.    10. There are limitations in the provision of medical care and treatment via Telehealth and the Service and you may not be able to receive diagnosis and/or treatment through the Service for every condition for which you seek diagnosis and/or treatment. 11. There are potential risks to the use of Telehealth, including but not limited to the risks described in this Telehealth Consent. 12. Your Provider(s) have discussed the use of Telehealth and the Service with you, including the benefits and risks of such and you have provided oral consent to your Provider(s) for the use of Telehealth and the Service. 15. You understand that it is your duty to provide your Provider(s) truthful, accurate and complete information, including all relevant information regarding care that you may have received or may be receiving from other healthcare providers outside of the Service. 14. You understand that each of your Provider(s) may determine in his or sole discretion that your condition is not suitable for diagnosis and/or treatment using the Service, and that you may need to seek medical care and treatment a specialist or other healthcare provider, outside of the Service. 15. You understand that you are fully responsible for payment for all services provided by Provider(s) or through use of the Service and that you may not be able to use third-party insurance. 16. You represent that (a) you have read this Telehealth Consent carefully, (b) you understand the risks and benefits of the Service and the use of Telehealth in the medical care and treatment provided to you by Provider(s) using the Service, and (c) you have the legal capacity and authority to provide this consent for yourself and/or the minor for which you are consenting under applicable federal and state laws, including laws relating to the age of [de-identified] and/or parental/guardian consent.    17. You give your informed consent to the use of Telehealth by Provider(s) using the Service under the terms described in the Terms of Service and this Telehealth Consent. The patient was read the following statement and has consented to the visit as of 2/12/21. The patient has been scheduled for their first telehealth visit on 2/12/21 with .

## 2021-02-16 NOTE — TELEPHONE ENCOUNTER
Called [facility] [facility] [employer] pt did bring in machine for download yesterday. Report to be faxed.

## 2021-05-04 ENCOUNTER — VIRTUAL VISIT (OUTPATIENT)
Dept: PULMONOLOGY | Age: 51
End: 2021-05-04
Payer: COMMERCIAL

## 2021-05-04 DIAGNOSIS — G47.33 OSA (OBSTRUCTIVE SLEEP APNEA): Primary | ICD-10-CM

## 2021-05-04 PROCEDURE — 99213 OFFICE O/P EST LOW 20 MIN: CPT | Performed by: INTERNAL MEDICINE

## 2021-05-04 ASSESSMENT — SLEEP AND FATIGUE QUESTIONNAIRES
ESS TOTAL SCORE: 2
HOW LIKELY ARE YOU TO NOD OFF OR FALL ASLEEP WHILE WATCHING TV: 0
HOW LIKELY ARE YOU TO NOD OFF OR FALL ASLEEP WHILE SITTING INACTIVE IN A PUBLIC PLACE: 0
HOW LIKELY ARE YOU TO NOD OFF OR FALL ASLEEP WHEN YOU ARE A PASSENGER IN A CAR FOR AN HOUR WITHOUT A BREAK: 0

## 2021-05-04 NOTE — PROGRESS NOTES
Initiated Episode with a Patient who has not had a related appointment within my department in the past 7 days or scheduled within the next 24 hours. Patient identification was verified at the start of the visit: YES  Pursuant to the emergency declaration under the [facility], [address] [place] [address] authority and the [employer] and [employer] Act, this Telephone Visit was conducted with patient's (and/or legal guardian's) consent, to reduce the patient's risk of exposure to COVID-19 and provide necessary medical care.   The patient (and/or legal guardian) his advised to contact this office for worsening conditions or problems, and seek emergency medical treatment and/or call 911 if urgent care needed,

## 2021-05-05 ENCOUNTER — TELEPHONE (OUTPATIENT)
Dept: PULMONOLOGY | Age: 51
End: 2021-05-05

## 2021-05-05 DIAGNOSIS — G47.33 OSA (OBSTRUCTIVE SLEEP APNEA): Primary | ICD-10-CM

## 2021-05-05 NOTE — TELEPHONE ENCOUNTER
Request compliance report in 30 days from [facility] due to pressure change. Attempted to contact patient to schedule 1 yr fu no answer or VM.

## 2021-06-08 NOTE — TELEPHONE ENCOUNTER
Tell pt that the changes we made to his settings did not result in improvement. I would like to change him to 17/14 (he was 18/14) & get download in 30 days.

## 2021-06-08 NOTE — TELEPHONE ENCOUNTER
Pt returned call and was informed. Order for pressure change faxed to [address]. Will watch for report in 30 days after pressure change.

## 2021-07-08 NOTE — TELEPHONE ENCOUNTER
Change to AUTO BIPAP IPAP max 19, EPAP min 14, PS 3.5    Tell patient I am returning him to the settings that were optimal.

## 2021-07-19 ENCOUNTER — TELEPHONE (OUTPATIENT)
Dept: PULMONOLOGY | Age: 51
End: 2021-07-19

## 2021-07-19 DIAGNOSIS — G47.33 OSA (OBSTRUCTIVE SLEEP APNEA): Primary | ICD-10-CM

## 2021-07-19 NOTE — TELEPHONE ENCOUNTER
Pt LVM stating that the recent pressure change is too high. The pressure takes his breath and pt has not been able to use machine. Pt is asking for pressure to be turned back down. Please advise. Per telephone encounter 5/5/21:   Change to AUTO BIPAP IPAP max 19, EPAP min 14, PS 3.5    VV 5/4/21:    Assessment:      1. Obstructive Sleep Apnea - severe with AHI of 47      Not addressed today  2. Atrial fibrillation followed by Dr. Morena Alvarado   3. Hypertension                Plan:      1. Change BiPAP to 18/14 from 17/12 and repeat download in 30 days   2. Patient has been advised: no driving while sleepy; weight loss recommended; systemic benefits of CPAP therapy have been discussed.     3.  One year f/u

## 2022-06-29 ENCOUNTER — TELEPHONE (OUTPATIENT)
Dept: PULMONOLOGY | Age: 52
End: 2022-06-29

## 2022-06-29 ENCOUNTER — OFFICE VISIT (OUTPATIENT)
Dept: PULMONOLOGY | Age: 52
End: 2022-06-29
Payer: COMMERCIAL

## 2022-06-29 VITALS
BODY MASS INDEX: 45.31 KG/M2 | DIASTOLIC BLOOD PRESSURE: 80 MMHG | HEART RATE: 87 BPM | SYSTOLIC BLOOD PRESSURE: 124 MMHG | WEIGHT: 299 LBS | OXYGEN SATURATION: 96 % | HEIGHT: 68 IN

## 2022-06-29 DIAGNOSIS — G47.33 OSA (OBSTRUCTIVE SLEEP APNEA): Primary | ICD-10-CM

## 2022-06-29 PROCEDURE — 99213 OFFICE O/P EST LOW 20 MIN: CPT | Performed by: INTERNAL MEDICINE

## 2022-06-29 ASSESSMENT — SLEEP AND FATIGUE QUESTIONNAIRES
HOW LIKELY ARE YOU TO NOD OFF OR FALL ASLEEP WHILE SITTING AND TALKING TO SOMEONE: 0
HOW LIKELY ARE YOU TO NOD OFF OR FALL ASLEEP WHILE SITTING INACTIVE IN A PUBLIC PLACE: 0
ESS TOTAL SCORE: 1
HOW LIKELY ARE YOU TO NOD OFF OR FALL ASLEEP WHILE WATCHING TV: 0
HOW LIKELY ARE YOU TO NOD OFF OR FALL ASLEEP WHILE SITTING AND READING: 0
HOW LIKELY ARE YOU TO NOD OFF OR FALL ASLEEP WHILE SITTING QUIETLY AFTER LUNCH WITHOUT ALCOHOL: 0
HOW LIKELY ARE YOU TO NOD OFF OR FALL ASLEEP WHEN YOU ARE A PASSENGER IN A CAR FOR AN HOUR WITHOUT A BREAK: 0
HOW LIKELY ARE YOU TO NOD OFF OR FALL ASLEEP IN A CAR, WHILE STOPPED FOR A FEW MINUTES IN TRAFFIC: 0
HOW LIKELY ARE YOU TO NOD OFF OR FALL ASLEEP WHILE LYING DOWN TO REST IN THE AFTERNOON WHEN CIRCUMSTANCES PERMIT: 1
NECK CIRCUMFERENCE (INCHES): 16

## 2022-06-29 NOTE — PROGRESS NOTES
Chief Complaint: Snoring, evaluate for Obstructive Sleep Apnea     Referring Provider: Jomar Clements    Interval History:     VICKIE treated with benefit with PAP 17/12. PAP use was reviewed and is used 4:59 hours on average, compliance with greater than 4 hour nightly use is 70%, residual AHI is 16.9, mostly CSA but some VICKIE. He feels things are working pretty well. He feels good. Not sleepy. Presenting HPI: This is a 42-year-old white male history of obstructive sleep apnea associated with daytime sleepiness, previously treated with BiPAP with improvement. He lost some weight and stopped BiPAP. However he started having significant nocturnal awakenings associated with nocturia approximately once each hour. He restarted his previous BiPAP and has had improvement in his sleep quality but needs new supplies. No exacerbating factors. reports that he has never smoked. His smokeless tobacco use includes snuff. [place] sleepiness score: 1    Physical Exam:  Blood pressure 124/80, pulse 87, height 5' 8\" (1.727 m), weight 299 lb (135.6 kg), SpO2 96 %.'  Constitutional:  No acute distress. HENT:  Oropharynx is clear and moist.   Neck: No tracheal deviation present. Cardiovascular: Normal heart sounds. No lower extremity edema. Pulmonary/Chest: No wheezes. No rhonchi. No rales. No decreased breath sounds. No accessory muscle usage or stridor. Musculoskeletal: No cyanosis. No clubbing. Skin: Skin is warm and dry. Psychiatric: Normal mood and affect. Neurologic: speech fluent, alert and oriented, strength symmetric           Jan 2014 Split with AHI of 47.5, down to 1.3 with BiPAP of 21/7    Assessment:      1. Obstructive Sleep Apnea - severe with AHI of 47     Not addressed today  2. Atrial fibrillation followed by Dr. Nicole Simpson   3. Hypertension      Plan:      1.   BiPAP 17/12 - did not tolerate trial of higher pressure 18/14 previously but is open to trying again prior to returning to the sleep lab. Will try 17/13, then get download. Will incrementally increase as tolerated and if AHI improving, stay the course. Otherwise, he will need repeat titration   2. Patient has been advised: no driving while sleepy; weight loss recommended; systemic benefits of CPAP therapy have been discussed.     3.  One year f/u

## 2022-08-01 NOTE — TELEPHONE ENCOUNTER
CR reviewed, AHI increased from 16.9 to 18.9, (8.4 centrals & 3.9 obstructives)     Please ask pt if he is doing ok on current pressure first--if he can tolerate it, we will increase EPAP by 1 -- increase from 17/13 to 17/14 cm H2O. Leave open.

## 2022-08-01 NOTE — TELEPHONE ENCOUNTER
Spoke with pt whom states that he is \"doing fine, but if they want to up it more, that's fine\". Pt states that the pressure was much higher before and he had a hard time keeping the mask on, but he is willing to have pressure changed. Please advise.

## 2022-08-11 NOTE — TELEPHONE ENCOUNTER
Compliance download for the last 10 days since pressure change is scanned for review. Please advise if office needs to continue to follow up on compliance?

## 2022-08-11 NOTE — TELEPHONE ENCOUNTER
AHI 16.9 (8.7 centrals, 4.2 obstructives) on BiPAP 17/12  31% periodic breathing  AHI 18.9 (8.4 centrals, 3.9 obstructives) on BiPAP 17/13  25% periodic breathing  AHI 15.4 (5.9 centrals, 3.3 obstructives) on BiPAP 17/14  34% periodic breathing    I called & s/w pt 8/12/22 -- he is doing well with the last pressure increase & would be fine with continuing up.   Increase to BiPAP 18/15

## 2023-02-07 ENCOUNTER — TELEPHONE (OUTPATIENT)
Dept: PULMONOLOGY | Age: 53
End: 2023-02-07

## 2023-02-07 NOTE — TELEPHONE ENCOUNTER
CR reviewed -- see last messages from prior telephone encounter from 6/29/22, pt supposed to be on BiPAP 18/15 but this CR says 17/12    Currently:  AHI 17.6 (8.8 centrals, 4.3 obstructives) on BiPAP 17/12  22% periodic breathing      Prior reports:   AHI 16.9 (8.7 centrals, 4.2 obstructives) on BiPAP 17/12  31% periodic breathing  AHI 18.9 (8.4 centrals, 3.9 obstructives) on BiPAP 17/13  25% periodic breathing  AHI 15.4 (5.9 centrals, 3.3 obstructives) on BiPAP 17/14  34% periodic breathing  Had planned to increase to 18/15 in August 2022

## 2023-02-07 NOTE — TELEPHONE ENCOUNTER
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 Report scanned to media and faxed to [email or web address]

## 2023-02-10 NOTE — TELEPHONE ENCOUNTER
Spoke with Anoop Mark at [facility] asking to look further into it, as when I logged onto care  to verify pressure setting, it shows that pressure was changed 1/15/23. Prior to 1/15/23, the setting was correct as per most recent order of 18/15.

## 2023-02-14 NOTE — TELEPHONE ENCOUNTER
Spoke with Deven at [facility] whom states that RT sIabelle Burgess was following up on the pressure change. Spoke with Isabelle Burgess whom states that she is waiting to hear back from RT about why the pressure was changed, indicating that [facility] never had an order for pressure change 18/15. Printed and gave Isabelle Burgess a copy of the order for pressure change and she is changing it back to the ordered settings 18/15.

## 2023-09-01 ENCOUNTER — TELEPHONE (OUTPATIENT)
Dept: PULMONOLOGY | Age: 53
End: 2023-09-01

## 2023-09-01 NOTE — TELEPHONE ENCOUNTER
Patient cancelled appointment on 9/5/23 with Dr. Dottie De Paz for 1 year VICKIE . Reason: Pt stated that he recently was diagnosed with cancer and is going in to have surgery on his throat. Pt stated he will call at  later time to r/s. Patient did not reschedule appointment. Appointment rescheduled for n/a. Last OV 6/29/23    Assessment:      1. Obstructive Sleep Apnea - severe with AHI of 47      Not addressed today  2. Atrial fibrillation followed by Dr. Alessandro Vidal   3. Hypertension                Plan:      1. BiPAP 17/12 - did not tolerate trial of higher pressure 18/14 previously but is open to trying again prior to returning to the sleep lab. Will try 17/13, then get download. Will incrementally increase as tolerated and if AHI improving, stay the course. Otherwise, he will need repeat titration   2. Patient has been advised: no driving while sleepy; weight loss recommended; systemic benefits of CPAP therapy have been discussed.     3.  One year f/u

## 2024-02-06 ENCOUNTER — TELEPHONE (OUTPATIENT)
Dept: PULMONOLOGY | Age: 54
End: 2024-02-06

## 2024-02-06 NOTE — TELEPHONE ENCOUNTER
Likely yes.  Has to be seen for insurer to allow us to schedule study.  I would recommend he see me or CMT with download at the visit and then we will likely order a repeat titration in sleep lab.

## 2024-02-06 NOTE — TELEPHONE ENCOUNTER
Pt called and stated that he has finished his radiation tx. Pt stated that he has lost a significant amount of weight and is wondering if he needs to do another sleep study to find out the status of his VICKIE. Please advise.

## 2024-02-07 NOTE — TELEPHONE ENCOUNTER
Pt CB stated he just started using machine this past Sunday 2/4/24 and did schedule a f/u appt for 3/7/24 to come in for a CR and discuss with Dr Guevara fitting and VICKIE

## 2024-03-07 ENCOUNTER — OFFICE VISIT (OUTPATIENT)
Dept: PULMONOLOGY | Age: 54
End: 2024-03-07
Payer: COMMERCIAL

## 2024-03-07 VITALS
HEART RATE: 81 BPM | RESPIRATION RATE: 14 BRPM | OXYGEN SATURATION: 97 % | SYSTOLIC BLOOD PRESSURE: 124 MMHG | BODY MASS INDEX: 38.83 KG/M2 | DIASTOLIC BLOOD PRESSURE: 84 MMHG | HEIGHT: 68 IN | WEIGHT: 256.2 LBS

## 2024-03-07 DIAGNOSIS — G47.33 OSA (OBSTRUCTIVE SLEEP APNEA): Primary | ICD-10-CM

## 2024-03-07 PROCEDURE — 3079F DIAST BP 80-89 MM HG: CPT | Performed by: INTERNAL MEDICINE

## 2024-03-07 PROCEDURE — 99214 OFFICE O/P EST MOD 30 MIN: CPT | Performed by: INTERNAL MEDICINE

## 2024-03-07 PROCEDURE — 3074F SYST BP LT 130 MM HG: CPT | Performed by: INTERNAL MEDICINE

## 2024-03-07 ASSESSMENT — SLEEP AND FATIGUE QUESTIONNAIRES
ESS TOTAL SCORE: 1
HOW LIKELY ARE YOU TO NOD OFF OR FALL ASLEEP WHEN YOU ARE A PASSENGER IN A CAR FOR AN HOUR WITHOUT A BREAK: 0
HOW LIKELY ARE YOU TO NOD OFF OR FALL ASLEEP WHILE SITTING AND TALKING TO SOMEONE: 0
HOW LIKELY ARE YOU TO NOD OFF OR FALL ASLEEP WHILE SITTING AND READING: 0
HOW LIKELY ARE YOU TO NOD OFF OR FALL ASLEEP WHILE LYING DOWN TO REST IN THE AFTERNOON WHEN CIRCUMSTANCES PERMIT: 1
NECK CIRCUMFERENCE (INCHES): 16
HOW LIKELY ARE YOU TO NOD OFF OR FALL ASLEEP WHILE WATCHING TV: 0
HOW LIKELY ARE YOU TO NOD OFF OR FALL ASLEEP WHILE SITTING QUIETLY AFTER LUNCH WITHOUT ALCOHOL: 0
HOW LIKELY ARE YOU TO NOD OFF OR FALL ASLEEP WHILE SITTING INACTIVE IN A PUBLIC PLACE: 0
HOW LIKELY ARE YOU TO NOD OFF OR FALL ASLEEP IN A CAR, WHILE STOPPED FOR A FEW MINUTES IN TRAFFIC: 0

## 2024-03-07 NOTE — PROGRESS NOTES
Chief Complaint: Snoring, evaluate for Obstructive Sleep Apnea     Referring Provider: Rom Zavala    Interval History 3/7/24  - diagnosed with left base of tongue SqCC treated at , just finished radiation and restarted BiPAP, 18/15, HI 22 with mostly centrals, has lost 70 pounds        Interval History:     VICKIE treated with benefit with PAP 17/12.  PAP use was reviewed and is used 4:59 hours on average, compliance with greater than 4 hour nightly use is 70%, residual AHI is 16.9, mostly CSA but some VICKIE.  He feels things are working pretty well.  He feels good. Not sleepy.      Presenting HPI: This is a 47-year-old white male history of obstructive sleep apnea associated with daytime sleepiness, previously treated with BiPAP with improvement.  He lost some weight and stopped BiPAP.  However he started having significant nocturnal awakenings associated with nocturia approximately once each hour.  He restarted his previous BiPAP and has had improvement in his sleep quality but needs new supplies.  No exacerbating factors.     reports that he has never smoked. His smokeless tobacco use includes snuff.        Collyer sleepiness score: 1    Physical Exam:  Blood pressure 124/84, pulse 81, resp. rate 14, height 1.727 m (5' 8\"), weight 116.2 kg (256 lb 3.2 oz), SpO2 97 %.'  Constitutional:  No acute distress.   HENT:  Oropharynx is clear and moist.   Neck: No tracheal deviation present.   Cardiovascular: Normal heart sounds.  No lower extremity edema.  Pulmonary/Chest: No wheezes. No rhonchi. No rales. No decreased breath sounds.  No accessory muscle usage or stridor.   Musculoskeletal: No cyanosis. No clubbing.  Skin: Skin is warm and dry.   Psychiatric: Normal mood and affect.  Neurologic: speech fluent, alert and oriented, strength symmetric         Jan 2014 Split with AHI of 47.5, down to 1.3 with BiPAP of 21/7    Download 3/4/24 personally reviewed, 100% compliance, residual CSA, AHI 22.7    Assessment:

## 2024-04-10 ENCOUNTER — TELEPHONE (OUTPATIENT)
Dept: PULMONOLOGY | Age: 54
End: 2024-04-10

## 2024-04-10 DIAGNOSIS — I48.11 LONGSTANDING PERSISTENT ATRIAL FIBRILLATION (HCC): ICD-10-CM

## 2024-04-10 DIAGNOSIS — G47.33 SEVERE OBSTRUCTIVE SLEEP APNEA: Primary | ICD-10-CM

## 2024-04-10 NOTE — TELEPHONE ENCOUNTER
Tell pt his BiPAP report still shows treatment is not as effective as it should be.  I'd like him to do a repeat CPAP titration in the sleep lab if he is willing.

## 2024-04-11 NOTE — TELEPHONE ENCOUNTER
Attempted to contact patient to inform that pap treatment is not as effective as could be. NA, NVM will try again later

## 2024-04-15 NOTE — TELEPHONE ENCOUNTER
I signed order for CPAP titration.  Please tell patient we can send the order to his sleep lab of choice, or if he transfers providers then we are happy to send records to his provider of choice.

## 2024-04-15 NOTE — TELEPHONE ENCOUNTER
Spoke to patient, he is willing to do another CPAP titration in the lab. He has moved to [place] since his last appt, so he may go ahead and get set up with the new sleep lab in order to be closer to work after the sleep study.

## 2024-04-18 NOTE — TELEPHONE ENCOUNTER
Spoke to pt to for him to get us the name of the providers he may want to use in [facility]. Pt stated it will be at [facility] and will get us the name of the doctor he may want to use over there